# Patient Record
Sex: MALE | Race: OTHER
[De-identification: names, ages, dates, MRNs, and addresses within clinical notes are randomized per-mention and may not be internally consistent; named-entity substitution may affect disease eponyms.]

---

## 2019-03-11 ENCOUNTER — HOSPITAL ENCOUNTER (OUTPATIENT)
Dept: HOSPITAL 47 - RADCTMAIN | Age: 54
End: 2019-03-11
Attending: INTERNAL MEDICINE
Payer: COMMERCIAL

## 2019-03-11 DIAGNOSIS — Z12.2: Primary | ICD-10-CM

## 2019-03-11 DIAGNOSIS — Z87.891: ICD-10-CM

## 2019-03-11 NOTE — CTL
EXAMINATION TYPE:  CT Low Dose Lung

 

DATE OF EXAM ORDERED: 3/11/2019

 

COMPARISON: None

 

HISTORY: . Low Dose CT Lung Screening

 

CT DLP: 97.2 mGycm

CT CTDI: 2.8 mGy

 

IV CONTRAST USED: None.

SCREENING VISIT: First visit

COMPARISON: None.

 

TECHNIQUE: Low dose computed tomography scan was performed through the chest at 1 millimeter thick se
ctions and reconstructed images in the coronal plane at 1 mm thick sections.

 

CT DIAGNOSTIC QUALITY:  Satisfactory

 

FINDINGS:

 

LUNG NODULES:  Not presentLeft lung: no nodules identified.Right lung: no nodules identified.

 

LUNGS:

COPD: Severity: None  

Fibrosis: Severity:None   

Lymph nodes: None  

Other findings: None  

 

RIGHT PLEURAL SPACE: 

Effusion: None  

Calcification: None   

Thickening: None  

Pneumothorax: None  

 

LEFT PLEURAL SPACE: 

Effusion: None  

Calcification: None  

Thickening: None   

Pneumothorax: None  

 

HEART:  

Heart Size: Mildly enlarged

Coronary calcification: Mild 

Pericardial effusion: None   

 

OTHER FINDINGS:

Upper abdomen: Right Adrenal glandular hyperplasia noted.

Bony thorax: Degenerative changes

Supraclavicular region: No significant abnormalityOther: No significant abnormalityI

 

IMPRESSION:

1. No distinct pulmonary nodules.

2. Right sided adrenal glandular hyperplasia.

 

FOLLOW UP CT CHEST 

 

RECOMMENDATION: Follow-up screening in one year

 

CT LUNG RAD: 

 

LUNG RAD CATEGORY 1

## 2024-11-18 ENCOUNTER — HOSPITAL ENCOUNTER (OUTPATIENT)
Dept: HOSPITAL 47 - LABWHC1 | Age: 59
Discharge: HOME | End: 2024-11-18
Attending: STUDENT IN AN ORGANIZED HEALTH CARE EDUCATION/TRAINING PROGRAM
Payer: COMMERCIAL

## 2024-11-18 DIAGNOSIS — N50.3: Primary | ICD-10-CM

## 2024-11-18 PROCEDURE — 36415 COLL VENOUS BLD VENIPUNCTURE: CPT

## 2024-11-18 PROCEDURE — 93005 ELECTROCARDIOGRAM TRACING: CPT
